# Patient Record
Sex: FEMALE | Race: WHITE
[De-identification: names, ages, dates, MRNs, and addresses within clinical notes are randomized per-mention and may not be internally consistent; named-entity substitution may affect disease eponyms.]

---

## 2017-01-08 ENCOUNTER — HOSPITAL ENCOUNTER (EMERGENCY)
Dept: HOSPITAL 74 - JER | Age: 47
Discharge: HOME | End: 2017-01-08
Payer: COMMERCIAL

## 2017-01-08 VITALS — BODY MASS INDEX: 23.9 KG/M2

## 2017-01-08 VITALS — SYSTOLIC BLOOD PRESSURE: 129 MMHG | HEART RATE: 90 BPM | DIASTOLIC BLOOD PRESSURE: 85 MMHG | TEMPERATURE: 98.5 F
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
## 2017-01-08 DIAGNOSIS — S00.202A: ICD-10-CM
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
## 2017-01-08 DIAGNOSIS — S05.12XA: ICD-10-CM

## 2017-01-08 DIAGNOSIS — S01.112A: Primary | ICD-10-CM
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
## 2017-01-08 PROCEDURE — 0HQ1XZZ REPAIR FACE SKIN, EXTERNAL APPROACH: ICD-10-PCS | Performed by: EMERGENCY MEDICINE

## 2017-01-08 NOTE — PDOC
History of Present Illness





- General


Chief Complaint: Eye Problem


Stated Complaint: LACERATION LT EYE


Time Seen by Provider: 01/08/17 01:31


History Source: Patient


Exam Limitations: No Limitations





- History of Present Illness


Occurred: reports: this evening


Pain Location: reports: face


Method of Injury: Yes: fall


Loss of Consciousness: no loss of consciousness


Associated Symptoms (Fall): denies symptoms





Past History





- Travel


Traveled outside of the country in the last 30 days: No


Close contact w/someone who was outside of country & ill: No





- Past Medical History


Allergies/Adverse Reactions: 


 Allergies











Allergy/AdvReac Type Severity Reaction Status Date / Time


 


Penicillins Allergy Intermediate Rash Verified 01/08/17 01:33











Other medical history: denies





- Immunization History


Td Vaccination: No


Immunization Up to Date: Yes





- Psycho/Social/Smoking Cessation Hx


Anxiety: Yes


Suicidal Ideation: No


Smoking History: Never smoked


Number of Cigarettes Smoked Daily: 0


Cigars Per Day: 0


Information on smoking cessation initiated: No


Hx Alcohol Use: No


Drug/Substance Use Hx: No





Trauma Specific PMHX





- Complaint Specific PMHX


Back Injury: No


Neck Injury: No





**Review of Systems





- Review of Systems


Able to Perform ROS?: Yes


Comments:: 





01/08/17 03:24


CONSTITUTIONAL: 


Absent: fever, chills, diaphoresis, generalized weakness, malaise, loss of 

appetite


HEENT: 


+pain to left periorbital rim


Absent: rhinorrhea, nasal congestion, throat pain, throat swelling, difficulty 

swallowing, mouth swelling, ear pain, eye pain, visual Changes


CARDIOVASCULAR: 


Absent: chest pain, loss of consciousness, palpitations, irregular heart rate, 

peripheral edema


RESPIRATORY: 


Absent: cough, shortness of breath, dyspnea with exertion, orthopnea, wheezing, 

stridor, hemoptysis


GASTROINTESTINAL:


Absent: abdominal pain, abdominal distension, nausea, vomiting, diarrhea, 

constipation, melena, hematochezia


GENITOURINARY: 


Absent: dysuria, frequency, urgency, hesitancy, hematuria, flank pain, genital 

pain


MUSCULOSKELETAL: 


Absent: myalgia, arthralgia, joint swelling


SKIN: 


Absent: rash, itching, pallor


HEMATOLOGIC/IMMUNOLOGIC: 


Absent: easy bleeding, easy bruising, lymphadenopathy, frequent infections


ENDOCRINE:


Absent: unexplained weight gain, unexplained weight loss, heat intolerance, 

cold intolerance


NEUROLOGIC: 


Absent: headache, focal weakness or paresthesias, dizziness, unsteady gait, 

seizure, mental status changes, bladder or bowel incontinence


PSYCHIATRIC: 


Absent: anxiety, depression, suicidal or homicidal ideation, hallucinations.


Is the patient limited English proficient: No





*Physical Exam





- Vital Signs


 Last Vital Signs











Temp Pulse Resp BP Pulse Ox


 


 98.5 F   90   20   129/85   100 


 


 01/08/17 01:34  01/08/17 01:34  01/08/17 01:34  01/08/17 01:34  01/08/17 01:34














- Physical Exam


Comments: 





01/08/17 03:25


GENERAL:


Well developed, well nourished. Awake and alert. No acute distress.


HEENT:


+left periorbital swelling/ecchymosis and pain on palp to rim


VA: 20/20 snellen chart ro OU/OD/OS


Normocephalic, atraumatic. PERRLA, EOMI. No conjunctival pallor. Sclera are non-

icteric. Moist mucous membranes. Oropharynx is clear.


NECK: 


Supple. Full ROM. No JVD. Carotid pulses 2+ and symmetric, without bruits. No 

thyromegaly. No lymphadenopathy.


CARDIOVASCULAR:


Regular rate and rhythm. No murmurs, rubs, or gallops. Distal pulses are 2+ and 

symmetric. 


PULMONARY: 


No evidence of respiratory distress. Lungs clear to auscultation bilaterally. 

No wheezing, rales or rhonchi.


ABDOMINAL:


Soft. Non-tender. Non-distended. No rebound or guarding. No organomegaly. 

Normoactive bowel sounds. 


MUSCULOSKELETAL 


Normal range of motion at all joints. No bony deformities or tenderness. No CVA 

tenderness.


EXTREMITIES: 


No cyanosis. No clubbing. No edema. No calf tenderness.


SKIN: 


Warm and dry. Normal capillary refill. No rashes. No jaundice. 


NEUROLOGICAL: 


Alert, awake, appropriate. Cranial nerves 2-12 intact. No deficits to light 

touch and temperature in face, upper extremities and lower extremities. No 

motor deficits in the in face, upper extremities and lower extremities. 

Normoreflexic in the upper and lower extremities. Normal speech. Toes are down-

going bilaterally. Gait is normal without ataxia.


PSYCHIATRIC: 


Cooperative. Good eye contact. Appropriate mood and affect.





Progress Note





- Progress Note


Progress Note: 


Left eyebrow:


2.5cm horizontal lac to mid region


Betadine prep; aseptic concentric 


1% lidocaine=1.5cc


NS irrigation/copioous


(4) 6.0 nylon ; simple interrupted


Bacitracin


Bandaid





Pt refuses Orbital Ct


Signed AMA





46-year-old female presents to the emergency department complaining of a 

laceration to the left eyebrow. Patient states she slipped and fell 9 hours 

ago but did not come to the emergency department because she had to attend a 

party. Patient denies any headache, dizziness, lightheadedness, neck pain, jaw 

pain, chest pain, back pain, shortness of breath, abdominal pains, flank pain, 

extremity numbness or tingling sensation.


Tetanus within 10 years.





*DC/Admit/Observation/Transfer


Diagnosis at time of Disposition: 


Laceration of eyebrow


Qualifiers:


 Encounter type: initial encounter Laterality: left Qualified Code(s): S01.112A 

- Laceration without foreign body of left eyelid and periocular area, initial 

encounter





Contusion of orbit


Qualifiers:


 Encounter type: initial encounter Laterality: left Qualified Code(s): S05.12XA 

- Contusion of eyeball and orbital tissues, left eye, initial encounter





- Discharge Dispostion


Disposition: HOME


Condition at time of disposition: Stable





- Patient Instructions


Printed Discharge Instructions:  DI for Laceration Repair -- Simple, Contusion


Additional Instructions: 


After a discussion regarding the pain and swelling around your left eye from 

your fall, you were offered a CAT scan to determine whether there is a 

fracture. You adamantly refused the CAT scan but agreed to return back to the 

emergency department for any visual disturbance, double vision, headaches, 

severe pain.





Otherwise, please follow these instructions for your suture repair: 





Keep the incision clean and dry for 24 hours.


After 24 hours, you may allow the soap and water to rinse off your incision.


Avoid direct pressure of the water to the incision.


Pat the incision dry with a clean clothe.


Apply a small amount of bacitracin onto the incision.


Cover the incision loosely with a bandaid.


Take tylenol/motrin as needed for pain.


Follow up with your physician or the ER in 48 hours for a wound check.


Return to the ER if you notice red streaks, increase redness/swelling/severe 

pain to the incision.





Suture removal in   6-7  days.





Ice the area around your left eye for the swelling. 10 mins on/10 mins off for 

48 hours while you're awake

## 2017-01-13 ENCOUNTER — HOSPITAL ENCOUNTER (EMERGENCY)
Dept: HOSPITAL 74 - JER | Age: 47
Discharge: HOME | End: 2017-01-13
Payer: COMMERCIAL

## 2017-01-13 VITALS — TEMPERATURE: 98.6 F | HEART RATE: 82 BPM | SYSTOLIC BLOOD PRESSURE: 132 MMHG | DIASTOLIC BLOOD PRESSURE: 67 MMHG

## 2017-01-13 VITALS — BODY MASS INDEX: 185.5 KG/M2

## 2017-01-13 DIAGNOSIS — Z48.02: Primary | ICD-10-CM

## 2017-01-13 NOTE — PDOC
Suture Removal/Wound Check HPI





- History of Present Illness


Chief Complaint: Suture/Staple Removal(Here)


Stated Complaint: SUTURE REMOVAL


Time Seen by Provider: 01/13/17 19:42


History Source: Yes: Patient


Exam Limitations: Yes: No Limitations


Treated at: [facility]


Date of Last ED visit: 01/08/17





- Previous ED Treatment


Type of procedure performed on last visit: Yes: Laceration Repair





Past History





- Past Medical History


Allergies/Adverse Reactions: 


Allergies





Penicillins Allergy (Intermediate, Verified 01/13/17 19:37)


 Rash








Home Medications: 


Ambulatory Orders





NK [No Known Home Medication]  01/13/17 











- Immunization History


Immunizations Up to Date: Yes





- Social History


Smoking Status: Never smoked


Number of Ciarettes Per Day: 0


Cigars Per Day: 0





Medical Decision Making





- Medical Decision Making





A/P:  45 y/o female here for suture removal.  Pt denies fever, discharge from 

wound.





Removed 4 sutures from patient's left eyebrow.  Margins remain together. 


Pt instructed to keep wound clean. 





The patient verbalizes understanding of all instructions, has no further 

questions and is awaiting discharge.





*DC/Admit/Observation/Transfer


Diagnosis at time of Disposition: 


 Visit for suture removal





- Discharge Dispostion


Disposition: HOME


Condition at time of disposition: Good





- Patient Instructions


Printed Discharge Instructions:  DI for Suture Removal

## 2022-01-31 ENCOUNTER — APPOINTMENT (OUTPATIENT)
Dept: GASTROENTEROLOGY | Facility: CLINIC | Age: 52
End: 2022-01-31
Payer: COMMERCIAL

## 2022-01-31 VITALS
HEART RATE: 90 BPM | SYSTOLIC BLOOD PRESSURE: 110 MMHG | HEIGHT: 63 IN | BODY MASS INDEX: 27.11 KG/M2 | WEIGHT: 153 LBS | DIASTOLIC BLOOD PRESSURE: 70 MMHG

## 2022-01-31 DIAGNOSIS — Z12.11 ENCOUNTER FOR SCREENING FOR MALIGNANT NEOPLASM OF COLON: ICD-10-CM

## 2022-01-31 PROBLEM — Z00.00 ENCOUNTER FOR PREVENTIVE HEALTH EXAMINATION: Status: ACTIVE | Noted: 2022-01-31

## 2022-01-31 PROCEDURE — 99243 OFF/OP CNSLTJ NEW/EST LOW 30: CPT

## 2022-01-31 NOTE — ASSESSMENT
[FreeTextEntry1] : Will plan on a colonoscopy for screening.  Explained risks/benefits/alternatives including not limited to bleeding, infection, perforation, missed lesions, anesthesia complications.  Patient understands and agrees, all questions answered.  Will use a split dose miralax/gatorade prep with clears the day prior.\par \par Thank you for referring Ms. ROSENBERG.  Please do not hesitate to call to further discuss his/her care.\par \par Note faxed to requesting MD.\par \par

## 2022-01-31 NOTE — HISTORY OF PRESENT ILLNESS
[FreeTextEntry1] : Ms. Boles is a pleasant 51F h/o RA on Humira, DM, HLD who is referred by Dr. Marcos for colon cancer screening.\par \par Feels well, normal brown BM daily.\par \par Had COVID last year, had the vaccine 7/21, since that time has c/o mild fatigue.\par \par Otherwise feels well, no complaints. No upper GI symptoms such as heartburn, regurgitation.\par \par Does not smoke or drink.\par \par No FHx of any GI malignancies.

## 2022-03-18 ENCOUNTER — APPOINTMENT (OUTPATIENT)
Dept: GASTROENTEROLOGY | Facility: HOSPITAL | Age: 52
End: 2022-03-18

## 2024-04-29 ENCOUNTER — RX ONLY (RX ONLY)
Age: 54
End: 2024-04-29

## 2024-04-29 ENCOUNTER — OFFICE (OUTPATIENT)
Dept: URBAN - METROPOLITAN AREA CLINIC 121 | Facility: CLINIC | Age: 54
Setting detail: OPHTHALMOLOGY
End: 2024-04-29
Payer: COMMERCIAL

## 2024-04-29 DIAGNOSIS — M06.9: ICD-10-CM

## 2024-04-29 DIAGNOSIS — H16.223: ICD-10-CM

## 2024-04-29 DIAGNOSIS — H40.023: ICD-10-CM

## 2024-04-29 DIAGNOSIS — E11.9: ICD-10-CM

## 2024-04-29 PROCEDURE — 92004 COMPRE OPH EXAM NEW PT 1/>: CPT | Performed by: OPHTHALMOLOGY

## 2024-04-29 PROCEDURE — 76514 ECHO EXAM OF EYE THICKNESS: CPT | Performed by: OPHTHALMOLOGY

## 2024-04-29 PROCEDURE — 92250 FUNDUS PHOTOGRAPHY W/I&R: CPT | Performed by: OPHTHALMOLOGY

## 2024-07-29 ENCOUNTER — OFFICE (OUTPATIENT)
Dept: URBAN - METROPOLITAN AREA CLINIC 121 | Facility: CLINIC | Age: 54
Setting detail: OPHTHALMOLOGY
End: 2024-07-29
Payer: COMMERCIAL

## 2024-07-29 DIAGNOSIS — H40.023: ICD-10-CM

## 2024-07-29 PROCEDURE — 92133 CPTRZD OPH DX IMG PST SGM ON: CPT | Performed by: OPHTHALMOLOGY

## 2024-07-29 PROCEDURE — 92083 EXTENDED VISUAL FIELD XM: CPT | Performed by: OPHTHALMOLOGY

## 2024-07-29 PROCEDURE — 99212 OFFICE O/P EST SF 10 MIN: CPT | Performed by: OPHTHALMOLOGY

## 2024-07-29 ASSESSMENT — CONFRONTATIONAL VISUAL FIELD TEST (CVF)
OD_FINDINGS: FULL
OS_FINDINGS: FULL

## 2025-01-29 ENCOUNTER — OFFICE (OUTPATIENT)
Facility: LOCATION | Age: 55
Setting detail: OPHTHALMOLOGY
End: 2025-01-29
Payer: COMMERCIAL

## 2025-01-29 DIAGNOSIS — H40.023: ICD-10-CM

## 2025-01-29 DIAGNOSIS — M06.9: ICD-10-CM

## 2025-01-29 DIAGNOSIS — E11.9: ICD-10-CM

## 2025-01-29 DIAGNOSIS — H16.223: ICD-10-CM

## 2025-01-29 PROCEDURE — 99213 OFFICE O/P EST LOW 20 MIN: CPT | Performed by: OPHTHALMOLOGY

## 2025-01-29 ASSESSMENT — REFRACTION_CURRENTRX
OS_CYLINDER: 0.00
OD_OVR_VA: 20/
OD_CYLINDER: +0.50
OS_OVR_VA: 20/
OD_AXIS: 029
OD_SPHERE: -5.00
OS_AXIS: 180
OS_SPHERE: -4.75

## 2025-01-29 ASSESSMENT — CONFRONTATIONAL VISUAL FIELD TEST (CVF)
OS_FINDINGS: FULL
OD_FINDINGS: FULL

## 2025-01-29 ASSESSMENT — TONOMETRY
OD_IOP_MMHG: 14
OS_IOP_MMHG: 14

## 2025-01-29 ASSESSMENT — KERATOMETRY
OD_K1POWER_DIOPTERS: 44.50
OS_K2POWER_DIOPTERS: 45.00
OD_AXISANGLE_DEGREES: 078
OS_K1POWER_DIOPTERS: 44.50
OD_K2POWER_DIOPTERS: 45.00
OS_AXISANGLE_DEGREES: 084

## 2025-01-29 ASSESSMENT — REFRACTION_AUTOREFRACTION
OD_CYLINDER: +0.25
OS_CYLINDER: 0.00
OD_SPHERE: -4.75
OS_SPHERE: -4.50
OS_AXIS: 180
OD_AXIS: 008

## 2025-01-29 ASSESSMENT — PACHYMETRY
OS_CT_CORRECTION: -4
OD_CT_CORRECTION: -4
OS_CT_UM: 592
OD_CT_UM: 608

## 2025-01-29 ASSESSMENT — SUPERFICIAL PUNCTATE KERATITIS (SPK)
OS_SPK: 1+
OD_SPK: 1+

## 2025-01-29 ASSESSMENT — VISUAL ACUITY
OD_BCVA: 20/30
OS_BCVA: 20/50

## 2025-08-12 ENCOUNTER — OFFICE (OUTPATIENT)
Facility: LOCATION | Age: 55
Setting detail: OPHTHALMOLOGY
End: 2025-08-12
Payer: COMMERCIAL

## 2025-08-12 DIAGNOSIS — H16.223: ICD-10-CM

## 2025-08-12 DIAGNOSIS — H40.023: ICD-10-CM

## 2025-08-12 DIAGNOSIS — E11.9: ICD-10-CM

## 2025-08-12 DIAGNOSIS — M06.9: ICD-10-CM

## 2025-08-12 PROCEDURE — 92250 FUNDUS PHOTOGRAPHY W/I&R: CPT | Performed by: OPHTHALMOLOGY

## 2025-08-12 PROCEDURE — 92014 COMPRE OPH EXAM EST PT 1/>: CPT | Performed by: OPHTHALMOLOGY

## 2025-08-12 ASSESSMENT — REFRACTION_CURRENTRX
OD_SPHERE: -5.00
OS_SPHERE: -4.75
OD_OVR_VA: 20/
OS_AXIS: 180
OD_AXIS: 029
OS_CYLINDER: 0.00
OD_CYLINDER: +0.50
OS_OVR_VA: 20/

## 2025-08-12 ASSESSMENT — TONOMETRY
OS_IOP_MMHG: 14
OD_IOP_MMHG: 14

## 2025-08-12 ASSESSMENT — SUPERFICIAL PUNCTATE KERATITIS (SPK)
OS_SPK: 1+
OD_SPK: 1+

## 2025-08-12 ASSESSMENT — REFRACTION_AUTOREFRACTION
OS_SPHERE: -4.50
OS_CYLINDER: +0.25
OD_SPHERE: -5.00
OD_AXIS: 005
OD_CYLINDER: +0.75
OS_AXIS: 145

## 2025-08-12 ASSESSMENT — CONFRONTATIONAL VISUAL FIELD TEST (CVF)
OD_FINDINGS: FULL
OS_FINDINGS: FULL

## 2025-08-12 ASSESSMENT — KERATOMETRY
OD_K2POWER_DIOPTERS: 44.75
OS_K1POWER_DIOPTERS: 44.50
OD_AXISANGLE_DEGREES: 141
OD_K1POWER_DIOPTERS: 44.50
OS_AXISANGLE_DEGREES: 084
OS_K2POWER_DIOPTERS: 44.75

## 2025-08-12 ASSESSMENT — VISUAL ACUITY
OS_BCVA: 20/50
OD_BCVA: 20/30

## 2025-08-12 ASSESSMENT — PACHYMETRY
OS_CT_UM: 592
OD_CT_CORRECTION: -4
OD_CT_UM: 608
OS_CT_CORRECTION: -4